# Patient Record
Sex: FEMALE | Race: WHITE | URBAN - METROPOLITAN AREA
[De-identification: names, ages, dates, MRNs, and addresses within clinical notes are randomized per-mention and may not be internally consistent; named-entity substitution may affect disease eponyms.]

---

## 2017-09-20 ENCOUNTER — OFFICE VISIT (OUTPATIENT)
Dept: FAMILY MEDICINE CLINIC | Age: 52
End: 2017-09-20

## 2017-09-20 VITALS
HEART RATE: 75 BPM | WEIGHT: 180 LBS | RESPIRATION RATE: 14 BRPM | OXYGEN SATURATION: 99 % | TEMPERATURE: 97.6 F | BODY MASS INDEX: 27.28 KG/M2 | SYSTOLIC BLOOD PRESSURE: 138 MMHG | HEIGHT: 68 IN | DIASTOLIC BLOOD PRESSURE: 78 MMHG

## 2017-09-20 DIAGNOSIS — Z11.3 SCREENING FOR STD (SEXUALLY TRANSMITTED DISEASE): ICD-10-CM

## 2017-09-20 DIAGNOSIS — Z02.89 HISTORY AND PHYSICAL EXAMINATION, IMMIGRATION: ICD-10-CM

## 2017-09-20 DIAGNOSIS — Z02.89 HISTORY AND PHYSICAL EXAMINATION, IMMIGRATION: Primary | ICD-10-CM

## 2017-09-20 DIAGNOSIS — Z11.1 SCREENING FOR TUBERCULOSIS: ICD-10-CM

## 2017-09-20 NOTE — PATIENT INSTRUCTIONS
Your lab results will be back in 3-4 days. If any of the tests come back positive, you will need a referral to your local Health Department to have parts of the form completed. Get any immunizations that are required at a local pharmacy and bring back proof (receipts) so I can complete the immunization section.     GO[employer]UCK

## 2017-09-20 NOTE — PROGRESS NOTES
HISTORY OF PRESENT ILLNESS  Dione Radford is a 45 y.o. female. HPI Comments: Pt is here for immigration PE. No known medical problems. Physical   Pertinent negatives include no chest pain, no abdominal pain, no headaches and no shortness of breath. Review of Systems   Constitutional: Negative for chills, fever, malaise/fatigue and weight loss. HENT: Negative for congestion and ear pain. Eyes: Negative for discharge and redness. Respiratory: Negative for cough, hemoptysis and shortness of breath. Cardiovascular: Negative for chest pain, palpitations and orthopnea. Gastrointestinal: Negative for abdominal pain, nausea and vomiting. Genitourinary: Negative for hematuria. Neurological: Negative for weakness and headaches. Endo/Heme/Allergies: Does not bruise/bleed easily. Physical Exam   Constitutional: Vital signs are normal. She appears well-developed and well-nourished. HENT:   Right Ear: Tympanic membrane and ear canal normal.   Left Ear: Tympanic membrane and ear canal normal.   Nose: Nose normal.   Mouth/Throat: Uvula is midline, oropharynx is clear and moist and mucous membranes are normal.   Eyes: Pupils are equal, round, and reactive to light. Neck: No thyromegaly present. Cardiovascular: Normal rate, regular rhythm and normal heart sounds. Pulmonary/Chest: Effort normal and breath sounds normal. No respiratory distress. She has no wheezes. Abdominal: She exhibits no distension. Lymphadenopathy:     She has no cervical adenopathy. Skin: No rash noted. No evidence of drug use   Psychiatric: She has a normal mood and affect. Her behavior is normal.   Nursing note and vitals reviewed. ASSESSMENT and PLAN    ICD-10-CM ICD-9-CM    1.  History and physical examination, immigration Z02.89 V70.3 NE COLLECTION VENOUS BLOOD,VENIPUNCTURE      N GONORRHOEA AMPLIFICATION      QUANTIFERON TB GOLD(CLIENT INCUB.)      RPR   2. Screening for tuberculosis Z11.1 V74.1 QUANTIFERON TB GOLD(CLIENT INCUB.)   3.  Screening for STD (sexually transmitted disease) Z11.3 V74.5 N GONORRHOEA AMPLIFICATION      RPR

## 2017-09-20 NOTE — PROGRESS NOTES
Rm 2  Pt presents to the clinic for an INS physical.     Depression Screening:  PHQ over the last two weeks 9/20/2017   Little interest or pleasure in doing things Not at all   Feeling down, depressed or hopeless Not at all   Total Score PHQ 2 0       Learning Assessment:  Learning Assessment 9/20/2017   PRIMARY LEARNER Patient   HIGHEST LEVEL OF EDUCATION - PRIMARY LEARNER  SOME COLLEGE   BARRIERS PRIMARY LEARNER NONE   CO-LEARNER CAREGIVER No   PRIMARY LANGUAGE ENGLISH   LEARNER PREFERENCE PRIMARY DEMONSTRATION   ANSWERED BY Patient   RELATIONSHIP SELF       Abuse Screening:  No flowsheet data found. Health Maintenance reviewed and discussed per provider: yes     Coordination of Care:    1. Have you been to the ER, urgent care clinic since your last visit? Hospitalized since your last visit? no    2. Have you seen or consulted any other health care providers outside of the [address] [place] [address] since your last visit? Include any pap smears or colon screening.  no

## 2017-09-20 NOTE — MR AVS SNAPSHOT
Visit Information Date & Time Provider Department Dept. Phone Encounter #  
 9/20/2017 10:30 AM Neyda Celeste MD [facility] [phone] [id number] Follow-up Instructions Return if symptoms worsen or fail to improve. Upcoming Health Maintenance Date Due DTaP/Tdap/Td series (1 - Tdap) 7/22/1984 PAP AKA CERVICAL CYTOLOGY 7/22/1984 BREAST CANCER SCRN MAMMOGRAM 7/22/2013 FOBT Q 1 YEAR AGE 50-75 7/22/2013 INFLUENZA AGE 9 TO ADULT 8/1/2017 Allergies as of 9/20/2017  Review Complete On: 9/20/2017 By: Yosef Perez LPN No Known Allergies Current Immunizations  Never Reviewed No immunizations on file. Not reviewed this visit You Were Diagnosed With   
  
 Codes Comments History and physical examination, immigration    -  Primary ICD-10-CM: Z02.89 ICD-9-CM: V70.3 Screening for tuberculosis     ICD-10-CM: Z11.1 ICD-9-CM: V74.1 Screening for STD (sexually transmitted disease)     ICD-10-CM: Z11.3 ICD-9-CM: V74.5 Vitals BP Pulse Temp Resp Height(growth percentile) Weight(growth percentile) 138/78 (BP 1 Location: Left arm, BP Patient Position: Sitting) 75 97.6 °F (36.4 °C) (Oral) 14 5' 8\" (1.727 m) 180 lb (81.6 kg) SpO2 BMI OB Status Smoking Status 99% 27.37 kg/m2 Menopause Never Smoker Vitals History BMI and BSA Data Body Mass Index Body Surface Area  
 27.37 kg/m 2 1.98 m 2 Preferred Pharmacy Pharmacy Name Phone [id number] [address] [place] [address] End [phone] Your Updated Medication List  
  
Notice  As of 9/20/2017 11:12 AM  
 You have not been prescribed any medications. We Performed the Following [place] COLLECTION VENOUS BLOOD,VENIPUNCTURE [id number] CPT(R)] Follow-up Instructions Return if symptoms worsen or fail to improve. To-Do List   
 09/20/2017   Lab:  N GONORRHOEA AMPLIFICATION   
  
  
 Patient Instructions Your lab results will be back in 3-4 days. If any of the tests come back positive, you will need a referral to your local Health Department to have parts of the form completed. Get any immunizations that are required at a local pharmacy and bring back proof (receipts) so I can complete the immunization section. [employer] Introducing [facility] [place] [facility]! [facility] introduces [email or web address] patient portal. Now you can access parts of your medical record, email your doctor's office, and request medication refills online. 1. In your internet browser, go to [email or web address] 2. Click on the First Time User? Click Here link in the Sign In box. You will see the New Member Sign Up page. 3. Enter your [email or web address] Access Code exactly as it appears below. You will not need to use this code after youve completed the sign-up process. If you do not sign up before the expiration date, you must request a new code. · [email or web address] Access Code: [email or web address] Expires: 12/19/2017 11:12 AM 
 
4. Enter the last four digits of your Social Security Number (xxxx) and Date of Birth (mm/dd/yyyy) as indicated and click Submit. You will be taken to the next sign-up page. 5. Create a [email or web address] ID. This will be your [email or web address] login ID and cannot be changed, so think of one that is secure and easy to remember. 6. Create a [email or web address] password. You can change your password at any time. 7. Enter your Password Reset Question and Answer. This can be used at a later time if you forget your password. 8. Enter your e-mail address. You will receive e-mail notification when new information is available in [address] 9. Click Sign Up. You can now view and download portions of your medical record. 10. Click the Download Summary menu link to download a portable copy of your medical information.  
 
If you have questions, please visit the Frequently Asked Questions section of the [employer]. Remember, [employer] is NOT to be used for urgent needs. For medical emergencies, dial 911. Now available from your iPhone and Android! Please provide this summary of care documentation to your next provider. If you have any questions after today's visit, please call [phone].